# Patient Record
Sex: MALE
[De-identification: names, ages, dates, MRNs, and addresses within clinical notes are randomized per-mention and may not be internally consistent; named-entity substitution may affect disease eponyms.]

---

## 2022-01-20 PROBLEM — Z00.00 ENCOUNTER FOR PREVENTIVE HEALTH EXAMINATION: Status: ACTIVE | Noted: 2022-01-20

## 2022-01-24 ENCOUNTER — APPOINTMENT (OUTPATIENT)
Dept: OTOLARYNGOLOGY | Facility: CLINIC | Age: 42
End: 2022-01-24
Payer: COMMERCIAL

## 2022-01-24 DIAGNOSIS — Z87.19 PERSONAL HISTORY OF OTHER DISEASES OF THE DIGESTIVE SYSTEM: ICD-10-CM

## 2022-01-24 DIAGNOSIS — J32.0 CHRONIC MAXILLARY SINUSITIS: ICD-10-CM

## 2022-01-24 PROCEDURE — 99204 OFFICE O/P NEW MOD 45 MIN: CPT | Mod: 25

## 2022-01-24 PROCEDURE — 31231 NASAL ENDOSCOPY DX: CPT

## 2022-01-24 RX ORDER — CEFUROXIME AXETIL 250 MG/1
250 TABLET ORAL
Qty: 20 | Refills: 0 | Status: ACTIVE | COMMUNITY
Start: 2022-01-24 | End: 1900-01-01

## 2022-01-24 RX ORDER — PANTOPRAZOLE 40 MG/1
TABLET, DELAYED RELEASE ORAL
Refills: 0 | Status: ACTIVE | COMMUNITY

## 2022-01-24 NOTE — ASSESSMENT
[FreeTextEntry1] : He was found to have mucosal thickening/opacification of the left maxillary sinus on dental CT scan. He will be undergoing a dental extraction on that side. Per Dr. Dominguez, he is at high risk for oroantral fistula. Nasal endoscopy showed a deviated septum but was otherwise unremarkable\par \par PLAN\par \par -findings and management options discussed in detail with the patient. \par -I am placing him on a course of antibiotics. \par -I am sending him for CT scan of the sinuses once he has completed antibiotics. We will assess the patency of the OMCs. I will also see if the opacification has improved. We'll discuss whether or not a sinus procedure is needed\par -Follow up after the CT scan  \par -call and return earlier if any concerns. \par

## 2022-01-24 NOTE — HISTORY OF PRESENT ILLNESS
[de-identified] : ROGELIO DANIELS is a 41 year patient Referred by Dr. Dominguez for sinus evaluation. He requires a dental extraction on a left upper tooth. He was found to have left maxillary sinus inflammation on dental scan. The OMCs were not visualized.  I was able to review the images provided. He  does not have a history recurrent sinus infections. He has no nasal congestion, obstruction, or nasal drainage. He has no sinus pain or pressure. He does have history of autoimmune disease and gastritis. He is on a PPI as well as a probiotic. He has not been on antibiotics recently. He does not smoke. He has no history of nasal or sinus surgery

## 2022-01-24 NOTE — CONSULT LETTER
[Dear  ___] : Dear  [unfilled], [Consult Letter:] : I had the pleasure of evaluating your patient, [unfilled]. [Please see my note below.] : Please see my note below. [Consult Closing:] : Thank you very much for allowing me to participate in the care of this patient.  If you have any questions, please do not hesitate to contact me. [Sincerely,] : Sincerely, [FreeTextEntry3] : Pam Hurd MD\par

## 2022-02-01 ENCOUNTER — OUTPATIENT (OUTPATIENT)
Dept: OUTPATIENT SERVICES | Facility: HOSPITAL | Age: 42
LOS: 1 days | End: 2022-02-01

## 2022-02-01 ENCOUNTER — APPOINTMENT (OUTPATIENT)
Dept: CT IMAGING | Facility: CLINIC | Age: 42
End: 2022-02-01
Payer: COMMERCIAL

## 2022-02-01 PROCEDURE — 70486 CT MAXILLOFACIAL W/O DYE: CPT | Mod: 26

## 2022-04-13 ENCOUNTER — APPOINTMENT (OUTPATIENT)
Dept: RADIOLOGY | Facility: HOSPITAL | Age: 42
End: 2022-04-13